# Patient Record
Sex: MALE | Race: WHITE | NOT HISPANIC OR LATINO | URBAN - METROPOLITAN AREA
[De-identification: names, ages, dates, MRNs, and addresses within clinical notes are randomized per-mention and may not be internally consistent; named-entity substitution may affect disease eponyms.]

---

## 2022-03-07 ENCOUNTER — EMERGENCY (EMERGENCY)
Facility: HOSPITAL | Age: 32
LOS: 1 days | Discharge: HOME | End: 2022-03-07
Attending: EMERGENCY MEDICINE
Payer: MEDICAID

## 2022-03-07 VITALS
DIASTOLIC BLOOD PRESSURE: 90 MMHG | OXYGEN SATURATION: 96 % | RESPIRATION RATE: 18 BRPM | HEART RATE: 84 BPM | WEIGHT: 195.11 LBS | HEIGHT: 66 IN | TEMPERATURE: 99 F | SYSTOLIC BLOOD PRESSURE: 151 MMHG

## 2022-03-07 PROCEDURE — 99283 EMERGENCY DEPT VISIT LOW MDM: CPT | Mod: 25

## 2022-03-07 PROCEDURE — 12001 RPR S/N/AX/GEN/TRNK 2.5CM/<: CPT

## 2022-03-07 PROCEDURE — 99282 EMERGENCY DEPT VISIT SF MDM: CPT | Mod: 25

## 2022-03-07 PROCEDURE — 90715 TDAP VACCINE 7 YRS/> IM: CPT

## 2022-03-07 PROCEDURE — 90471 IMMUNIZATION ADMIN: CPT

## 2022-03-07 RX ORDER — TETANUS TOXOID, REDUCED DIPHTHERIA TOXOID AND ACELLULAR PERTUSSIS VACCINE, ADSORBED 5; 2.5; 8; 8; 2.5 [IU]/.5ML; [IU]/.5ML; UG/.5ML; UG/.5ML; UG/.5ML
0.5 SUSPENSION INTRAMUSCULAR ONCE
Refills: 0 | Status: COMPLETED | OUTPATIENT
Start: 2022-03-07 | End: 2022-03-07

## 2022-03-07 RX ADMIN — TETANUS TOXOID, REDUCED DIPHTHERIA TOXOID AND ACELLULAR PERTUSSIS VACCINE, ADSORBED 0.5 MILLILITER(S): 5; 2.5; 8; 8; 2.5 SUSPENSION INTRAMUSCULAR at 21:19

## 2022-03-07 NOTE — ED PROVIDER NOTE - OBJECTIVE STATEMENT
30yo M pmh HTN (not on medication) presenting to ED with laceration on L forearm caused by accident at work. Was cutting boxes when lost site of tip of razor and cut his arm, immediate bleeding but controlled with pressure after 5 mins and run under water at sink for 1 minute. Pain is minimal localized to laceration. No other injuries. Denies SI or HI. Last tetanus 2016.

## 2022-03-07 NOTE — ED ADULT TRIAGE NOTE - CHIEF COMPLAINT QUOTE
cut wrist accidently with razor blade at work. small laceration noted, bleeding controlled. dressed in triage

## 2022-03-07 NOTE — ED PROVIDER NOTE - NSFOLLOWUPINSTRUCTIONS_ED_ALL_ED_FT
You were seen and evaluated today for a laceration. You received 3 stitches. We also gave you a tetanus booster.     - Your stitches will need to be removed within 7 days.   - You may experience some pain and swelling after discharge. Please manage this with ice packs for 15 minutes once ever 1-2 hours        -Ibuprofen (advil or motrin) 400-600 mg ever 6-8 hours for first 2 days  - Keep the wound dry for the first day, then wash lightly with soap and water every day after. Dry well. You do not need to keep the wound covered    Please return to the Emergency Department should you experience any of the following:  - Wound opening or excessive pain  - Yellow drainage  - Reddening of the skin around the wound  - Fever or chills

## 2022-03-07 NOTE — ED ADULT NURSE NOTE - OBJECTIVE STATEMENT
30 y/o male presenting to ED c/o left sided wrist/inner forearm laceration that pt sustained while at work with razor blade, denies any numbness, tingling, or dec sensation to the left arm/wrist/hand/or fingers. bleeding controlled. laceration repaired by Ed Resident at the bedside, pt tolerated well, bacitracin and bandage applied, dressing remains clean, dry and intact. tetanus administered to r. deltoid. safety maintained, pt pending d/c

## 2022-03-07 NOTE — ED PROVIDER NOTE - PATIENT PORTAL LINK FT
You can access the FollowMyHealth Patient Portal offered by [facility] by registering at the following website: [email or web address]. By joining [facility]’s FollowMyHealth portal, you will also be able to view your health information using other applications (apps) compatible with our system.

## 2022-03-07 NOTE — ED PROVIDER NOTE - CLINICAL SUMMARY MEDICAL DECISION MAKING FREE TEXT BOX
Attending MD Gallardo:  31M with laceration to the left volar wrist, sustained from a razor blade. Tetanus UTD. No NV compromise distally. Will irrigate and repair primarily at the bedside.      *The above represents an initial assessment/impression. Please refer to progress notes for potential changes in patient clinical course*

## 2022-03-07 NOTE — ED PROVIDER NOTE - PHYSICAL EXAMINATION
GENERAL: non-toxic appearing, alert, in NAD  HEENT: atraumatic, normocephalic, Vision grossly intact, no conjunctivitis or discharge, hearing grossly intact,  no nasal discharge, epistaxis   CARDIAC: RRR, normal S1S2,  no appreciable murmurs, no cyanosis, cap refill < 2 seconds  PULM: normal work of breathing, oxygen saturation on RA wnl, CTAB, no crackles, rales, rhonchi, or wheezing  NEURO: awake and alert, follows commands, normal speech, PERRLA, EOMI, no focal motor or sensory deficits  MSK: spine appears normal, no joint swelling or erythema, ranging all extremities with no appreciable loss of ROM  EXT: no peripheral edema, calf tenderness, redness or swelling  SKIN: 2cm superficial linear laceration of ventral/lateral aspect of R forearm with clean margins, light sanguinous drainage, no foreign bodies appreciated, no surrounding erythema. No other rash or lacerations.  PSYCH: appropriate mood and affect GENERAL: non-toxic appearing, alert, in NAD  HEENT: atraumatic, normocephalic, Vision grossly intact, no conjunctivitis or discharge, hearing grossly intact,  no nasal discharge, epistaxis   CARDIAC: RRR, normal S1S2,  no appreciable murmurs, no cyanosis, cap refill < 2 seconds  PULM: normal work of breathing, oxygen saturation on RA wnl, CTAB, no crackles, rales, rhonchi, or wheezing  NEURO: awake and alert, follows commands, normal speech, PERRLA, EOMI, no focal motor or sensory deficits  MSK: spine appears normal, no joint swelling or erythema, ranging all extremities with no appreciable loss of ROM  EXT: no peripheral edema, calf tenderness, redness or swelling  SKIN: 2cm superficial linear laceration of ventral/lateral aspect of L forearm with clean margins, light sanguinous drainage, no foreign bodies appreciated, no surrounding erythema. No other rash or lacerations.  PSYCH: appropriate mood and affect

## 2022-03-07 NOTE — ED PROVIDER NOTE - ATTENDING CONTRIBUTION TO CARE
Attending MD Gallardo:  I personally have seen and examined this patient. I have performed a substantive portion of the visit including all aspects of the medical decision making.  Resident note reviewed and agree on plan of care and except where noted.  See HPI, PE, and MDM for details.        T(C): 37 (07 Mar 2022 19:29), Max: 37 (07 Mar 2022 19:29)  T(F): 98.6 (07 Mar 2022 19:29), Max: 98.6 (07 Mar 2022 19:29)  HR: 84 (07 Mar 2022 19:29) (84 - 84)  BP: 151/90 (07 Mar 2022 19:29) (151/90 - 151/90)  RR: 18 (07 Mar 2022 19:29) (18 - 18)  SpO2: 96% (07 Mar 2022 19:29) (96% - 96%)      Attending MD Gallardo:    Gen: no apparent distress   Pysch: appropriate affect    Neuro: moves all extremities spontaneously     left wrist: volar aspect, ulnar side: ~7mm linear partial thickness laceration. No visualized flexor tendons, SILT distally, full flexion and extension of all digits of left hand.          *The above represents an initial assessment/impression. Please refer to progress notes for potential changes in patient clinical course*

## 2024-04-11 RX ORDER — OXYCODONE HYDROCHLORIDE 5 MG/1
1 TABLET ORAL
Qty: 20 | Refills: 0
Start: 2024-04-11